# Patient Record
Sex: MALE | Race: BLACK OR AFRICAN AMERICAN | NOT HISPANIC OR LATINO
[De-identification: names, ages, dates, MRNs, and addresses within clinical notes are randomized per-mention and may not be internally consistent; named-entity substitution may affect disease eponyms.]

---

## 2019-06-19 ENCOUNTER — TRANSCRIPTION ENCOUNTER (OUTPATIENT)
Age: 64
End: 2019-06-19

## 2019-07-15 ENCOUNTER — APPOINTMENT (OUTPATIENT)
Dept: PODIATRY | Facility: CLINIC | Age: 64
End: 2019-07-15
Payer: MEDICARE

## 2019-07-15 VITALS
SYSTOLIC BLOOD PRESSURE: 130 MMHG | WEIGHT: 164 LBS | BODY MASS INDEX: 24.29 KG/M2 | HEIGHT: 69 IN | DIASTOLIC BLOOD PRESSURE: 70 MMHG

## 2019-07-15 DIAGNOSIS — Z85.46 PERSONAL HISTORY OF MALIGNANT NEOPLASM OF PROSTATE: ICD-10-CM

## 2019-07-15 PROBLEM — Z00.00 ENCOUNTER FOR PREVENTIVE HEALTH EXAMINATION: Status: ACTIVE | Noted: 2019-07-15

## 2019-07-15 PROCEDURE — 99203 OFFICE O/P NEW LOW 30 MIN: CPT

## 2019-07-15 RX ORDER — CHLORTHALIDONE 25 MG/1
25 TABLET ORAL
Refills: 0 | Status: ACTIVE | COMMUNITY

## 2019-07-15 RX ORDER — DOCUSATE SODIUM 100 MG/1
100 CAPSULE, LIQUID FILLED ORAL
Refills: 0 | Status: ACTIVE | COMMUNITY

## 2019-07-15 RX ORDER — LORAZEPAM 1 MG/1
1 TABLET ORAL
Refills: 0 | Status: ACTIVE | COMMUNITY

## 2019-07-15 RX ORDER — UBIDECARENONE/VIT E ACET 100MG-5
1000 CAPSULE ORAL
Refills: 0 | Status: ACTIVE | COMMUNITY

## 2019-07-15 RX ORDER — FERROUS SULFATE 325(65) MG
325 TABLET ORAL
Refills: 0 | Status: ACTIVE | COMMUNITY

## 2019-07-15 RX ORDER — ARIPIPRAZOLE 15 MG/1
15 TABLET ORAL
Refills: 0 | Status: ACTIVE | COMMUNITY

## 2019-07-15 RX ORDER — STANNOUS FLUORIDE 1 MG/G
0.4 GEL, DENTIFRICE DENTAL
Refills: 0 | Status: ACTIVE | COMMUNITY

## 2019-07-15 NOTE — REVIEW OF SYSTEMS
[As Noted in HPI] : as noted in HPI [Fever] : no fever [Chills] : no chills [Wheezing] : no wheezing [Cough] : no cough

## 2019-07-15 NOTE — PROCEDURE
[FreeTextEntry1] : Using sterile instrumentation and aggressive slant back procedure was done to the affected border. Upon removal of the distal aspect of the nail one notes a severely callused nail groove. At that point using a sterile 15 blade as well as the aid of a [place] blade the area was debrided, packed with Silvadene and a dry sterile bandage applied. Discharge instructions were given to the patient and advised bacitracin daily for the next 3-5 days and if not completely better they should contact the office immediately.\par

## 2019-07-15 NOTE — PHYSICAL EXAM
[General Appearance - In No Acute Distress] : in no acute distress [General Appearance - Well Nourished] : well nourished [General Appearance - Well Developed] : well developed [Full Pulse] : the pedal pulses are present [Edema] : there was no peripheral edema [FreeTextEntry1] : IGN both borders of both great toes, no evidence of infectious process

## 2019-07-15 NOTE — HISTORY OF PRESENT ILLNESS
[FreeTextEntry1] : Mentally challenged, HPI came from caregiver\par Location: both great toes\par Duration: a few weeks\par Acute: yes\par Chronic:\par Past Tx: nothing\par Exacerbated by:\par

## 2019-09-16 ENCOUNTER — APPOINTMENT (OUTPATIENT)
Dept: PODIATRY | Facility: CLINIC | Age: 64
End: 2019-09-16
Payer: MEDICARE

## 2019-09-16 VITALS
WEIGHT: 164 LBS | HEIGHT: 69 IN | SYSTOLIC BLOOD PRESSURE: 130 MMHG | BODY MASS INDEX: 24.29 KG/M2 | DIASTOLIC BLOOD PRESSURE: 80 MMHG

## 2019-09-16 PROCEDURE — 99213 OFFICE O/P EST LOW 20 MIN: CPT

## 2019-09-16 NOTE — HISTORY OF PRESENT ILLNESS
[FreeTextEntry1] : Location-bilateral great toes\par Duration-since he's been a resident of the home\par Past tx-slant backs\par \par

## 2019-09-16 NOTE — REVIEW OF SYSTEMS
[As Noted in HPI] : as noted in HPI [Fever] : no fever [Chills] : no chills [de-identified] : mentally challenged

## 2019-12-03 ENCOUNTER — APPOINTMENT (OUTPATIENT)
Dept: PODIATRY | Facility: CLINIC | Age: 64
End: 2019-12-03
Payer: MEDICARE

## 2019-12-03 VITALS
DIASTOLIC BLOOD PRESSURE: 80 MMHG | BODY MASS INDEX: 24.29 KG/M2 | HEIGHT: 69 IN | SYSTOLIC BLOOD PRESSURE: 130 MMHG | WEIGHT: 164 LBS

## 2019-12-03 PROCEDURE — 11721 DEBRIDE NAIL 6 OR MORE: CPT

## 2019-12-03 NOTE — HISTORY OF PRESENT ILLNESS
[FreeTextEntry1] : The patient presents for follow up of chronic and painful mycotic nail disease. Past professional  tx's in the presence of PAD have consisted of periodic debridements which have offered significant relief and controlling of symptoms\par \par \par

## 2019-12-03 NOTE — REVIEW OF SYSTEMS
[As Noted in HPI] : as noted in HPI [Fever] : no fever [Chills] : no chills [de-identified] : mentally challenged

## 2019-12-03 NOTE — PROCEDURE
[FreeTextEntry1] : Using sterile instrumentation debridement of all nails manually and electrically to decrease thickness, pain and girth and make shoe gear more comfortable with "slant back" procedure of any bordering spicules causing pain\par \par

## 2020-02-07 ENCOUNTER — APPOINTMENT (OUTPATIENT)
Dept: PODIATRY | Facility: CLINIC | Age: 65
End: 2020-02-07
Payer: MEDICARE

## 2020-02-07 VITALS
BODY MASS INDEX: 24.29 KG/M2 | DIASTOLIC BLOOD PRESSURE: 80 MMHG | WEIGHT: 164 LBS | HEIGHT: 69 IN | SYSTOLIC BLOOD PRESSURE: 136 MMHG

## 2020-02-07 VITALS
HEIGHT: 69 IN | WEIGHT: 164 LBS | BODY MASS INDEX: 24.29 KG/M2 | SYSTOLIC BLOOD PRESSURE: 110 MMHG | DIASTOLIC BLOOD PRESSURE: 60 MMHG

## 2020-02-07 PROCEDURE — 99213 OFFICE O/P EST LOW 20 MIN: CPT

## 2020-02-07 NOTE — PROCEDURE
[FreeTextEntry1] : Using sterile instrumentation and aggressive slant back procedure was done to the affected border. Upon removal of the distal aspect of the nail one notes a severely callused nail groove. At that point using a sterile 15 blade as well as the aid of a [place] blade the area was debrided, packed with Silvadene and a dry sterile bandage applied. Discharge instructions were given to the patient and advised bacitracin daily for the next 3-5 days and if not completely better they should contact the office immediately.\par

## 2020-02-07 NOTE — PHYSICAL EXAM
[General Appearance - Well Nourished] : well nourished [Full Pulse] : the pedal pulses are present [General Appearance - In No Acute Distress] : in no acute distress [Edema] : there was no peripheral edema [Veins - Varicosity Changes] : there were no varicosital changes [FreeTextEntry1] : Evaluation of the area of chief complaint reveals a mild noninfected but reddened distal portion of both sides of both hallux nails . There is no discharge or drainage there is no sign of infectious process signs and symptoms are consistent with the noninfected ingrown nail. remainder of nails not in need of care

## 2020-02-07 NOTE — REVIEW OF SYSTEMS
[Negative] : Musculoskeletal [Lower Ext Edema] : no extremity edema [Skin Lesions] : no skin lesions [Skin Wound] : no skin wound [Dry Skin] : no dry skin

## 2020-02-07 NOTE — HISTORY OF PRESENT ILLNESS
[FreeTextEntry1] : The patient presents for follow up of chronic and painful mycotic nail disease. Past professional  tx's in the presence of PAD have consisted of periodic debridements which have offered significant relief and controlling of symptoms\par However upon initial evaluation today it appears the patient has treated himself but has left incurvated edge of great toenails

## 2020-02-24 ENCOUNTER — APPOINTMENT (OUTPATIENT)
Dept: PODIATRY | Facility: CLINIC | Age: 65
End: 2020-02-24
Payer: MEDICARE

## 2020-02-24 VITALS
SYSTOLIC BLOOD PRESSURE: 140 MMHG | WEIGHT: 164 LBS | DIASTOLIC BLOOD PRESSURE: 80 MMHG | HEIGHT: 69 IN | BODY MASS INDEX: 24.29 KG/M2

## 2020-02-24 DIAGNOSIS — L03.032 CELLULITIS OF LEFT TOE: ICD-10-CM

## 2020-02-24 PROCEDURE — 99213 OFFICE O/P EST LOW 20 MIN: CPT

## 2020-02-24 RX ORDER — MUPIROCIN 20 MG/G
2 OINTMENT TOPICAL TWICE DAILY
Qty: 1 | Refills: 1 | Status: ACTIVE | COMMUNITY
Start: 2020-02-24 | End: 1900-01-01

## 2020-02-24 NOTE — PHYSICAL EXAM
[General Appearance - In No Acute Distress] : in no acute distress [General Appearance - Well Nourished] : well nourished [Full Pulse] : the pedal pulses are present [General Appearance - Well Developed] : well developed [Edema] : there was no peripheral edema [Veins - Varicosity Changes] : there were no varicosital changes [FreeTextEntry1] : Evaluation of the area of chief complaint reveals a mild noninfected but reddened distal portion of the afffected nail. There is no discharge or drainage there is no sign of infectious process signs and symptoms are consistent with the mildly infected ingrown nail left medial

## 2020-02-24 NOTE — PROCEDURE
[FreeTextEntry1] : Patient needs partial nail avilsion under local anesthesia (lidocaine). Patient picks his nails (long hx of) and appears to have given himself an iatrogenic paranychia.\par Multiple attempts made however risk of injury to himself, my MA and meself copuld not risk injection of local.\par Recommend pre medication. D/w caregiver and brief note sent back.\par

## 2020-02-24 NOTE — HISTORY OF PRESENT ILLNESS
[FreeTextEntry1] : Location-medial side left great toe\par Duration-unknown\par Past tx-nothing\par

## 2020-02-24 NOTE — REVIEW OF SYSTEMS
[Confused] : confusion [Negative] : Constitutional [Skin Lesions] : no skin lesions [Skin Wound] : no skin wound [de-identified] : IGN left medial great toe

## 2020-03-10 ENCOUNTER — APPOINTMENT (OUTPATIENT)
Dept: PODIATRY | Facility: CLINIC | Age: 65
End: 2020-03-10
Payer: MEDICARE

## 2020-03-10 VITALS
DIASTOLIC BLOOD PRESSURE: 80 MMHG | SYSTOLIC BLOOD PRESSURE: 140 MMHG | WEIGHT: 164 LBS | BODY MASS INDEX: 24.29 KG/M2 | HEIGHT: 69 IN

## 2020-03-10 PROCEDURE — 99213 OFFICE O/P EST LOW 20 MIN: CPT

## 2020-03-12 NOTE — HISTORY OF PRESENT ILLNESS
[FreeTextEntry1] : Location-medial side left great toe\par Duration-unknown\par Past tx-eval prior and topical abx crream. referred for f/u and possible PNPA\par

## 2020-03-12 NOTE — REVIEW OF SYSTEMS
[Confused] : confusion [Leg Claudication] : no intermittent leg claudication [Lower Ext Edema] : no extremity edema [Joint Swelling] : no joint swelling [Joint Stiffness] : no joint stiffness [Limb Swelling] : no limb swelling [Skin Lesions] : no skin lesions [Skin Wound] : no skin wound [de-identified] : IGN left medial great toe

## 2020-03-12 NOTE — PHYSICAL EXAM
[Full Pulse] : the pedal pulses are present [Edema] : there was no peripheral edema [Veins - Varicosity Changes] : there were no varicosital changes [FreeTextEntry1] : Evaluation of the area of chief complaint shows improvement and no evidence of infection. There is no discharge.

## 2020-05-12 ENCOUNTER — APPOINTMENT (OUTPATIENT)
Dept: PODIATRY | Facility: CLINIC | Age: 65
End: 2020-05-12
Payer: MEDICARE

## 2020-05-12 VITALS
HEIGHT: 69 IN | DIASTOLIC BLOOD PRESSURE: 80 MMHG | WEIGHT: 164 LBS | SYSTOLIC BLOOD PRESSURE: 140 MMHG | BODY MASS INDEX: 24.29 KG/M2

## 2020-05-12 PROCEDURE — 99212 OFFICE O/P EST SF 10 MIN: CPT

## 2020-05-12 RX ORDER — CEPHALEXIN 500 MG/1
500 CAPSULE ORAL 3 TIMES DAILY
Qty: 21 | Refills: 1 | Status: DISCONTINUED | COMMUNITY
Start: 2020-02-24 | End: 2020-05-12

## 2020-05-12 NOTE — PROCEDURE
[FreeTextEntry1] : I have spoken to the caregiver and advised the NP at the home has to visually inspect the patient prior to coming in for a visit\par follow up prn\par

## 2020-05-12 NOTE — REVIEW OF SYSTEMS
[As Noted in HPI] : as noted in HPI [Nosebleeds] : no nosebleeds [Nasal Discharge] : no nasal discharge [Lower Ext Edema] : no extremity edema [Shortness Of Breath] : no shortness of breath [Wheezing] : no wheezing [Cough] : no cough [Joint Swelling] : no joint swelling [Limb Swelling] : no limb swelling [Skin Lesions] : no skin lesions [Skin Wound] : no skin wound

## 2020-05-12 NOTE — HISTORY OF PRESENT ILLNESS
[FreeTextEntry1] : The patient presents for follow up of chronic and painful mycotic nail disease. Past professional  tx's in the presence of PAD have consisted of periodic debridements which have offered significant relief and controlling of symptoms, however recently the patient has simply been evaluated and a complete podiatric LE exam 2/2 the patient picking and cutting his nails too short for me to render care\par

## 2020-05-12 NOTE — PHYSICAL EXAM
[General Appearance - In No Acute Distress] : in no acute distress [General Appearance - Well Nourished] : well nourished [General Appearance - Well Developed] : well developed [Full Pulse] : the pedal pulses are present [Edema] : there was no peripheral edema [Veins - Varicosity Changes] : there were no varicosital changes [FreeTextEntry1] : onychobryphosis present, however, all nails are far too short to be cut, self treatment is obvious given the growth pattern

## 2020-05-15 ENCOUNTER — APPOINTMENT (OUTPATIENT)
Dept: PODIATRY | Facility: CLINIC | Age: 65
End: 2020-05-15

## 2020-10-15 NOTE — PROCEDURE
Spoke to Mom, instructed per below. Scheduled lab Appt in [facility].  Mom knows it is to be fasting [FreeTextEntry1] : Using sterile instrumentation and aggressive slant back procedure was done to the affected border. Upon removal of the distal aspect of the nail one notes a severely callused nail groove. At that point using a sterile 15 blade as well as the aid of a [place] blade the area was debrided, packed with Silvadene and a dry sterile bandage applied. Discharge instructions were given to the patient and advised bacitracin daily for the next 3-5 days and if not completely better they should contact the office immediately.\par

## 2020-11-16 ENCOUNTER — TRANSCRIPTION ENCOUNTER (OUTPATIENT)
Age: 65
End: 2020-11-16

## 2020-11-19 ENCOUNTER — APPOINTMENT (OUTPATIENT)
Dept: PODIATRY | Facility: CLINIC | Age: 65
End: 2020-11-19
Payer: MEDICARE

## 2020-11-19 VITALS — BODY MASS INDEX: 24.29 KG/M2 | WEIGHT: 164 LBS | HEIGHT: 69 IN

## 2020-11-19 PROCEDURE — 99213 OFFICE O/P EST LOW 20 MIN: CPT

## 2020-11-19 NOTE — REVIEW OF SYSTEMS
[As Noted in HPI] : as noted in HPI [Fever] : no fever [Chills] : no chills [de-identified] : mentally challenged

## 2020-11-19 NOTE — PROCEDURE
[FreeTextEntry1] : Using sterile instrumentation and aggressive slant back procedure was done to the affected border. Upon removal of the distal aspect of the nail one notes a severely callused nail groove. At that point using a sterile 15 blade as well as the aid of a [place] blade the area was debrided, packed with Silvadene and a dry sterile bandage applied. Discharge instructions were given to the patient and advised bacitracin daily for the next 3-5 days and if not completely better they should contact the office immediately.\par follow up prn\par \par

## 2021-01-20 ENCOUNTER — APPOINTMENT (OUTPATIENT)
Dept: PODIATRY | Facility: CLINIC | Age: 66
End: 2021-01-20

## 2021-01-28 ENCOUNTER — APPOINTMENT (OUTPATIENT)
Dept: PODIATRY | Facility: CLINIC | Age: 66
End: 2021-01-28

## 2021-01-28 VITALS — HEIGHT: 69 IN | WEIGHT: 164 LBS | BODY MASS INDEX: 24.29 KG/M2

## 2021-04-08 ENCOUNTER — APPOINTMENT (OUTPATIENT)
Dept: PODIATRY | Facility: CLINIC | Age: 66
End: 2021-04-08
Payer: MEDICARE

## 2021-04-08 ENCOUNTER — APPOINTMENT (OUTPATIENT)
Dept: PODIATRY | Facility: CLINIC | Age: 66
End: 2021-04-08

## 2021-04-08 VITALS — BODY MASS INDEX: 24.29 KG/M2 | WEIGHT: 164 LBS | HEIGHT: 69 IN

## 2021-04-08 DIAGNOSIS — M79.671 PAIN IN RIGHT FOOT: ICD-10-CM

## 2021-04-08 PROCEDURE — 99213 OFFICE O/P EST LOW 20 MIN: CPT

## 2021-04-08 NOTE — REVIEW OF SYSTEMS
[As Noted in HPI] : as noted in HPI [Fever] : no fever [Chills] : no chills [de-identified] : mentally challenged

## 2021-08-31 ENCOUNTER — APPOINTMENT (OUTPATIENT)
Dept: PODIATRY | Facility: CLINIC | Age: 66
End: 2021-08-31
Payer: MEDICARE

## 2021-08-31 VITALS — HEIGHT: 69 IN | BODY MASS INDEX: 24.29 KG/M2 | WEIGHT: 164 LBS

## 2021-08-31 PROCEDURE — 99213 OFFICE O/P EST LOW 20 MIN: CPT

## 2021-08-31 NOTE — REVIEW OF SYSTEMS
[As Noted in HPI] : as noted in HPI [Fever] : no fever [Chills] : no chills [de-identified] : mentally challenged

## 2021-08-31 NOTE — HISTORY OF PRESENT ILLNESS
[FreeTextEntry1] : Location-bilateral great toes\par Duration-since he's been a resident of the home\par Past tx-slant backs\par \par The above-named patient was referred here  for evaluation and management  as well as a complete podiatric evaluation regarding their lower extremity. An overall podiatric evaluation and management is requested\par \par \par

## 2021-08-31 NOTE — PROCEDURE
[FreeTextEntry1] : Using sterile instrumentation and aggressive slant back procedure was done to the affected border. Upon removal of the distal aspect of the nail one notes a severely callused nail groove. At that point using a sterile 15 blade as well as the aid of a [place] blade the area was debrided, packed with Silvadene and a dry sterile bandage applied. Discharge instructions were given to the patient and advised bacitracin daily for the next 3-5 days and if not completely better they should contact the office immediately.\par follow up prn\par \par

## 2021-08-31 NOTE — PHYSICAL EXAM
[General Appearance - In No Acute Distress] : in no acute distress [General Appearance - Well Nourished] : well nourished [General Appearance - Well Developed] : well developed [Full Pulse] : the pedal pulses are present [Edema] : there was no peripheral edema [de-identified] : overall muscle strength testing is decreased, but consistant with the patients age and medical problems. Mild atrophy and overall weakness present.\par  [FreeTextEntry1] : IGN both borders of both great toes, no evidence of infectious process

## 2021-11-11 ENCOUNTER — APPOINTMENT (OUTPATIENT)
Dept: PODIATRY | Facility: CLINIC | Age: 66
End: 2021-11-11
Payer: MEDICARE

## 2021-11-11 VITALS — WEIGHT: 164 LBS | HEIGHT: 69 IN | BODY MASS INDEX: 24.29 KG/M2

## 2021-11-11 DIAGNOSIS — L60.2 ONYCHOGRYPHOSIS: ICD-10-CM

## 2021-11-11 DIAGNOSIS — L60.3 NAIL DYSTROPHY: ICD-10-CM

## 2021-11-11 PROCEDURE — 99212 OFFICE O/P EST SF 10 MIN: CPT

## 2021-11-11 NOTE — REVIEW OF SYSTEMS
[As Noted in HPI] : as noted in HPI [Fever] : no fever [Chills] : no chills [de-identified] : mentally challenged

## 2021-11-11 NOTE — PROCEDURE
[FreeTextEntry1] : Podiatric evaluation done\par no acute pathology\par nail care not needed\par follow up prn\par \par

## 2021-11-11 NOTE — HISTORY OF PRESENT ILLNESS
[FreeTextEntry1] : The above-named patient was referred here  for evaluation and management  as well as a complete podiatric evaluation regarding their lower extremity. An overall podiatric evaluation and management is requested\par \par \par

## 2021-11-11 NOTE — PHYSICAL EXAM
[General Appearance - In No Acute Distress] : in no acute distress [General Appearance - Well Nourished] : well nourished [General Appearance - Well Developed] : well developed [Full Pulse] : the pedal pulses are present [Edema] : there was no peripheral edema [Skin Color & Pigmentation] : normal skin color and pigmentation [Skin Turgor] : normal skin turgor [] : no rash [de-identified] : \par

## 2022-01-13 NOTE — PROCEDURE
No changes   [FreeTextEntry1] : Using sterile instrumentation and aggressive slant back procedure was done to the affected border. Upon removal of the distal aspect of the nail one notes a severely callused nail groove. At that point using a sterile 15 blade as well as the aid of a [place] blade the area was debrided, packed with Silvadene and a dry sterile bandage applied. Discharge instructions were given to the patient and advised bacitracin daily for the next 3-5 days and if not completely better they should contact the office immediately.\par follow up prn\par \par

## 2022-01-24 RX ORDER — NYSTATIN 100000 1/G
100000 POWDER TOPICAL
Qty: 1 | Refills: 5 | Status: ACTIVE | COMMUNITY
Start: 2021-01-28 | End: 1900-01-01

## 2022-02-01 RX ORDER — MICONAZOLE NITRATE 20 MG/G
2 POWDER TOPICAL TWICE DAILY
Qty: 1 | Refills: 5 | Status: ACTIVE | COMMUNITY
Start: 2022-02-01 | End: 1900-01-01

## 2022-05-28 ENCOUNTER — NON-APPOINTMENT (OUTPATIENT)
Age: 67
End: 2022-05-28

## 2023-06-16 ENCOUNTER — APPOINTMENT (OUTPATIENT)
Dept: PODIATRY | Facility: CLINIC | Age: 68
End: 2023-06-16
Payer: MEDICARE

## 2023-06-16 DIAGNOSIS — M79.672 PAIN IN LEFT FOOT: ICD-10-CM

## 2023-06-16 DIAGNOSIS — F29 UNSPECIFIED PSYCHOSIS NOT DUE TO A SUBSTANCE OR KNOWN PHYSIOLOGICAL CONDITION: ICD-10-CM

## 2023-06-16 DIAGNOSIS — L60.0 INGROWING NAIL: ICD-10-CM

## 2023-06-16 PROCEDURE — 99212 OFFICE O/P EST SF 10 MIN: CPT

## 2023-06-16 NOTE — PROCEDURE
[FreeTextEntry1] : Using sterile instrumentation a slant back procedure was done to the affected border. The area was then painted with a small amount of betadine and silvadene and a band aid. Discharge instructions were given to the patient and advised bacitracin daily for the next 3-5 days and if not completely better they should contact the office immediately.\par

## 2023-06-16 NOTE — PHYSICAL EXAM
[FreeTextEntry3] : The vascular exam reveals decreased pedal pulses bilateral, a capillary fill time of 3-5 seconds,  mild atrophic skin changes, mild varicosities absence of hair growth, but no cyanosis, clubbing or mottling seen. [FreeTextEntry1] : Evaluation of the area of chief complaint reveals a mild noninfected but reddened distal portion of the afffected nail. There is no discharge or drainage there is no sign of infectious process signs and symptoms are consistent with the noninfected ingrown nail lateral side of left GTN\par

## 2023-06-16 NOTE — REVIEW OF SYSTEMS
[Joint Swelling] : no joint swelling [Joint Stiffness] : no joint stiffness [Skin Lesions] : no skin lesions [Skin Wound] : no skin wound [As Noted in HPI] : as noted in HPI

## 2023-06-30 RX ORDER — MICONAZOLE NITRATE 20 MG/G
2 POWDER TOPICAL TWICE DAILY
Qty: 1 | Refills: 5 | Status: ACTIVE | COMMUNITY
Start: 2023-06-30 | End: 1900-01-01

## 2023-08-28 ENCOUNTER — APPOINTMENT (OUTPATIENT)
Dept: PODIATRY | Facility: CLINIC | Age: 68
End: 2023-08-28
Payer: MEDICARE

## 2023-08-28 DIAGNOSIS — F72 SEVERE INTELLECTUAL DISABILITIES: ICD-10-CM

## 2023-08-28 DIAGNOSIS — B35.1 TINEA UNGUIUM: ICD-10-CM

## 2023-08-28 DIAGNOSIS — F09 UNSPECIFIED MENTAL DISORDER DUE TO KNOWN PHYSIOLOGICAL CONDITION: ICD-10-CM

## 2023-08-28 PROCEDURE — 99212 OFFICE O/P EST SF 10 MIN: CPT

## 2023-08-28 NOTE — REVIEW OF SYSTEMS
[Fever] : no fever [Chills] : no chills [As Noted in HPI] : as noted in HPI [de-identified] : mentally challenged

## 2023-08-28 NOTE — PHYSICAL EXAM
[FreeTextEntry3] : The vascular exam reveals decreased pedal pulses bilateral, a capillary fill time of 3-5 seconds,  mild atrophic skin changes, mild varicosities absence of hair growth, but no cyanosis, clubbing or mottling seen. [de-identified] : \par   [Skin Color & Pigmentation] : normal skin color and pigmentation [Skin Turgor] : normal skin turgor [Foot Ulcer] : no foot ulcer [Skin Induration] : no skin induration [FreeTextEntry1] : It is obvious that nearly every visit the patient has self treated, and by unknown means with either his own devices, or by picking his own nails that he self treats himself. I would be more than happy should the patient need further podiatric services to see him on an as-needed basis however to bring the patient in on a scheduled basis for debridement of nails when he has no nails to debride is certainly not indicated [General Appearance - In No Acute Distress] : in no acute distress [General Appearance - Well Nourished] : well nourished [General Appearance - Well Developed] : well developed [Full Pulse] : the pedal pulses are present [Edema] : there was no peripheral edema [] : no rash [Skin Lesions] : no skin lesions

## 2023-08-28 NOTE — PROCEDURE
[FreeTextEntry1] : Podiatric evaluation done no acute pathology nail care not needed discharge from care to be seen only for acute needs. Routine follow ups are not indicated nor needed